# Patient Record
Sex: FEMALE | Race: WHITE | NOT HISPANIC OR LATINO | Employment: PART TIME | URBAN - NONMETROPOLITAN AREA
[De-identification: names, ages, dates, MRNs, and addresses within clinical notes are randomized per-mention and may not be internally consistent; named-entity substitution may affect disease eponyms.]

---

## 2017-02-22 RX ORDER — BETAXOLOL HYDROCHLORIDE 5 MG/ML
2 SOLUTION/ DROPS OPHTHALMIC ONCE
Status: CANCELLED | OUTPATIENT
Start: 2017-02-22 | End: 2017-02-22

## 2017-02-27 RX ORDER — ALBUTEROL SULFATE 2.5 MG/3ML
2.5 SOLUTION RESPIRATORY (INHALATION) EVERY 4 HOURS PRN
COMMUNITY

## 2017-02-27 RX ORDER — NAPROXEN SODIUM 220 MG
220 TABLET ORAL 2 TIMES DAILY PRN
COMMUNITY

## 2017-02-27 RX ORDER — ALBUTEROL SULFATE 90 UG/1
2 AEROSOL, METERED RESPIRATORY (INHALATION) EVERY 4 HOURS PRN
COMMUNITY

## 2017-03-01 ENCOUNTER — HOSPITAL ENCOUNTER (OUTPATIENT)
Facility: HOSPITAL | Age: 72
Setting detail: HOSPITAL OUTPATIENT SURGERY
Discharge: HOME | End: 2017-03-01
Attending: OPHTHALMOLOGY | Admitting: OPHTHALMOLOGY

## 2017-03-01 ENCOUNTER — ANESTHESIA EVENT (OUTPATIENT)
Dept: PERIOP | Facility: HOSPITAL | Age: 72
End: 2017-03-01

## 2017-03-01 ENCOUNTER — ANESTHESIA (OUTPATIENT)
Dept: PERIOP | Facility: HOSPITAL | Age: 72
End: 2017-03-01

## 2017-03-01 VITALS
HEART RATE: 67 BPM | WEIGHT: 165 LBS | SYSTOLIC BLOOD PRESSURE: 166 MMHG | BODY MASS INDEX: 27.49 KG/M2 | DIASTOLIC BLOOD PRESSURE: 88 MMHG | RESPIRATION RATE: 18 BRPM | HEIGHT: 65 IN | OXYGEN SATURATION: 97 % | TEMPERATURE: 97.4 F

## 2017-03-01 PROBLEM — H25.9 SENILE CATARACT OF LEFT EYE: Status: RESOLVED | Noted: 2017-03-01 | Resolved: 2017-03-01

## 2017-03-01 PROBLEM — H25.9 SENILE CATARACT OF LEFT EYE: Status: ACTIVE | Noted: 2017-03-01

## 2017-03-01 LAB — GLUCOSE BLDC GLUCOMTR-MCNC: 114 MG/DL (ref 70–130)

## 2017-03-01 PROCEDURE — V2787 ASTIGMATISM-CORRECT FUNCTION: HCPCS | Performed by: OPHTHALMOLOGY

## 2017-03-01 PROCEDURE — 25010000002 EPINEPHRINE 1 MG/ML SOLUTION: Performed by: OPHTHALMOLOGY

## 2017-03-01 PROCEDURE — 25010000002 MIDAZOLAM PER 1 MG: Performed by: NURSE ANESTHETIST, CERTIFIED REGISTERED

## 2017-03-01 PROCEDURE — V2632 POST CHMBR INTRAOCULAR LENS: HCPCS | Performed by: OPHTHALMOLOGY

## 2017-03-01 PROCEDURE — 82962 GLUCOSE BLOOD TEST: CPT

## 2017-03-01 DEVICE — IMPLANTABLE DEVICE: Type: IMPLANTABLE DEVICE | Site: POSTERIOR CHAMBER | Status: FUNCTIONAL

## 2017-03-01 RX ORDER — KETAMINE HYDROCHLORIDE 50 MG/ML
INJECTION, SOLUTION, CONCENTRATE INTRAMUSCULAR; INTRAVENOUS AS NEEDED
Status: DISCONTINUED | OUTPATIENT
Start: 2017-03-01 | End: 2017-03-01 | Stop reason: SURG

## 2017-03-01 RX ORDER — EPINEPHRINE 1 MG/ML
INJECTION INTRAMUSCULAR; INTRAVENOUS; SUBCUTANEOUS AS NEEDED
Status: DISCONTINUED | OUTPATIENT
Start: 2017-03-01 | End: 2017-03-01 | Stop reason: HOSPADM

## 2017-03-01 RX ORDER — CYCLOPENTOLATE HYDROCHLORIDE 20 MG/ML
1 SOLUTION/ DROPS OPHTHALMIC
Status: COMPLETED | OUTPATIENT
Start: 2017-03-01 | End: 2017-03-01

## 2017-03-01 RX ORDER — PILOCARPINE HYDROCHLORIDE 10 MG/ML
SOLUTION/ DROPS OPHTHALMIC AS NEEDED
Status: DISCONTINUED | OUTPATIENT
Start: 2017-03-01 | End: 2017-03-01 | Stop reason: HOSPADM

## 2017-03-01 RX ORDER — CYCLOPENTOLATE HYDROCHLORIDE 20 MG/ML
SOLUTION/ DROPS OPHTHALMIC
Status: COMPLETED
Start: 2017-03-01 | End: 2017-03-01

## 2017-03-01 RX ORDER — SODIUM CHLORIDE 0.9 % (FLUSH) 0.9 %
3 SYRINGE (ML) INJECTION AS NEEDED
Status: DISCONTINUED | OUTPATIENT
Start: 2017-03-01 | End: 2017-03-01 | Stop reason: HOSPADM

## 2017-03-01 RX ORDER — PHENYLEPHRINE HYDROCHLORIDE 100 MG/ML
1 SOLUTION/ DROPS OPHTHALMIC
Status: COMPLETED | OUTPATIENT
Start: 2017-03-01 | End: 2017-03-01

## 2017-03-01 RX ORDER — TETRACAINE HYDROCHLORIDE 5 MG/ML
SOLUTION OPHTHALMIC
Status: COMPLETED
Start: 2017-03-01 | End: 2017-03-01

## 2017-03-01 RX ORDER — SODIUM CHLORIDE 0.9 % (FLUSH) 0.9 %
1-10 SYRINGE (ML) INJECTION AS NEEDED
Status: DISCONTINUED | OUTPATIENT
Start: 2017-03-01 | End: 2017-03-01 | Stop reason: HOSPADM

## 2017-03-01 RX ORDER — MIDAZOLAM HYDROCHLORIDE 1 MG/ML
INJECTION INTRAMUSCULAR; INTRAVENOUS AS NEEDED
Status: DISCONTINUED | OUTPATIENT
Start: 2017-03-01 | End: 2017-03-01 | Stop reason: SURG

## 2017-03-01 RX ORDER — PHENYLEPHRINE HYDROCHLORIDE 100 MG/ML
SOLUTION/ DROPS OPHTHALMIC
Status: COMPLETED
Start: 2017-03-01 | End: 2017-03-01

## 2017-03-01 RX ORDER — TETRACAINE HYDROCHLORIDE 5 MG/ML
1 SOLUTION OPHTHALMIC SEE ADMIN INSTRUCTIONS
Status: COMPLETED | OUTPATIENT
Start: 2017-03-01 | End: 2017-03-01

## 2017-03-01 RX ORDER — PREDNISOLONE ACETATE 10 MG/ML
SUSPENSION/ DROPS OPHTHALMIC
Status: DISCONTINUED
Start: 2017-03-01 | End: 2017-03-01 | Stop reason: HOSPADM

## 2017-03-01 RX ORDER — LIDOCAINE HYDROCHLORIDE 40 MG/ML
INJECTION, SOLUTION RETROBULBAR; TOPICAL AS NEEDED
Status: DISCONTINUED | OUTPATIENT
Start: 2017-03-01 | End: 2017-03-01 | Stop reason: HOSPADM

## 2017-03-01 RX ORDER — PREDNISOLONE ACETATE 10 MG/ML
SUSPENSION/ DROPS OPHTHALMIC AS NEEDED
Status: DISCONTINUED | OUTPATIENT
Start: 2017-03-01 | End: 2017-03-01 | Stop reason: HOSPADM

## 2017-03-01 RX ORDER — ENALAPRILAT 2.5 MG/2ML
1.25 INJECTION INTRAVENOUS ONCE AS NEEDED
Status: DISCONTINUED | OUTPATIENT
Start: 2017-03-01 | End: 2017-03-01 | Stop reason: HOSPADM

## 2017-03-01 RX ORDER — BALANCED SALT SOLUTION 6.4; .75; .48; .3; 3.9; 1.7 MG/ML; MG/ML; MG/ML; MG/ML; MG/ML; MG/ML
SOLUTION OPHTHALMIC AS NEEDED
Status: DISCONTINUED | OUTPATIENT
Start: 2017-03-01 | End: 2017-03-01 | Stop reason: HOSPADM

## 2017-03-01 RX ORDER — SODIUM CHLORIDE, SODIUM LACTATE, POTASSIUM CHLORIDE, CALCIUM CHLORIDE 600; 310; 30; 20 MG/100ML; MG/100ML; MG/100ML; MG/100ML
1000 INJECTION, SOLUTION INTRAVENOUS CONTINUOUS PRN
Status: DISCONTINUED | OUTPATIENT
Start: 2017-03-01 | End: 2017-03-01 | Stop reason: HOSPADM

## 2017-03-01 RX ADMIN — TETRACAINE HYDROCHLORIDE 1 DROP: 5 SOLUTION OPHTHALMIC at 07:28

## 2017-03-01 RX ADMIN — CYCLOPENTOLATE HYDROCHLORIDE 1 DROP: 20 SOLUTION/ DROPS OPHTHALMIC at 07:30

## 2017-03-01 RX ADMIN — PHENYLEPHRINE HYDROCHLORIDE 1 DROP: 100 SOLUTION/ DROPS OPHTHALMIC at 07:30

## 2017-03-01 RX ADMIN — MIDAZOLAM HYDROCHLORIDE 1 MG: 1 INJECTION, SOLUTION INTRAMUSCULAR; INTRAVENOUS at 08:55

## 2017-03-01 RX ADMIN — KETAMINE HYDROCHLORIDE 10 MG: 50 INJECTION, SOLUTION INTRAMUSCULAR; INTRAVENOUS at 08:55

## 2017-03-01 RX ADMIN — SODIUM CHLORIDE, POTASSIUM CHLORIDE, SODIUM LACTATE AND CALCIUM CHLORIDE 1000 ML: 600; 310; 30; 20 INJECTION, SOLUTION INTRAVENOUS at 07:41

## 2017-03-01 RX ADMIN — PHENYLEPHRINE HYDROCHLORIDE 1 DROP: 100 SOLUTION/ DROPS OPHTHALMIC at 07:35

## 2017-03-01 RX ADMIN — MIDAZOLAM HYDROCHLORIDE 1 MG: 1 INJECTION, SOLUTION INTRAMUSCULAR; INTRAVENOUS at 08:50

## 2017-03-01 RX ADMIN — CYCLOPENTOLATE HYDROCHLORIDE 1 DROP: 20 SOLUTION/ DROPS OPHTHALMIC at 07:40

## 2017-03-01 RX ADMIN — TETRACAINE HYDROCHLORIDE 1 DROP: 5 SOLUTION OPHTHALMIC at 07:29

## 2017-03-01 RX ADMIN — CYCLOPENTOLATE HYDROCHLORIDE 1 DROP: 20 SOLUTION/ DROPS OPHTHALMIC at 07:35

## 2017-03-01 RX ADMIN — PHENYLEPHRINE HYDROCHLORIDE 1 DROP: 100 SOLUTION/ DROPS OPHTHALMIC at 07:40

## 2017-03-01 NOTE — PLAN OF CARE
Problem: Perioperative Period (Adult)  Goal: Signs and Symptoms of Listed Potential Problems Will be Absent or Manageable (Perioperative Period)  Outcome: Ongoing (interventions implemented as appropriate)    03/01/17 0723   Perioperative Period   Problems Assessed (Perioperative Period) all   Problems Present (Perioperative Period) none

## 2017-03-01 NOTE — ANESTHESIA POSTPROCEDURE EVALUATION
Patient: Maia Grimes    Procedure Summary     Date Anesthesia Start Anesthesia Stop Room / Location    03/01/17 0853 0919 [facility] OR 1 / [facility] OR       Procedure Diagnosis Surgeon Provider    LEFT CATARACT PHACO EXTRACTION WITH INTRAOCULAR LENS IMPLANT WITH TORIC LENS (Left Eye) No diagnosis on file. MD Anam Negrete CRNA          Anesthesia Type: MAC  Last vitals  BP      Temp      Pulse     Resp      SpO2        Post Anesthesia Care and Evaluation    Patient location during evaluation: bedside  Patient participation: complete - patient participated  Level of consciousness: awake and alert  Pain management: satisfactory to patient  Airway patency: patent  Anesthetic complications: No anesthetic complications  PONV Status: none  Cardiovascular status: acceptable and stable  Respiratory status: acceptable  Hydration status: acceptable

## 2017-03-01 NOTE — ANESTHESIA PREPROCEDURE EVALUATION
Anesthesia Evaluation     Patient summary reviewed and Nursing notes reviewed   history of anesthetic complications: PONV  NPO Status: > 8 hours   Airway   Mallampati: I  Neck ROM: full  no difficulty expected  Dental      Pulmonary - normal exam   (+) asthma (trigger-upper and lower respiratory infections),   Cardiovascular - normal exam    (-) CHF      Neuro/Psych  GI/Hepatic/Renal/Endo    (+)  diabetes mellitus (diet controlled) type 2 well controlled,     Musculoskeletal     Abdominal    Substance History      OB/GYN          Other                                    Anesthesia Plan    ASA 2     MAC     intravenous induction   Anesthetic plan and risks discussed with patient.

## 2017-03-04 NOTE — OP NOTE
DATE OF PROCEDURE: 03/01/2017    PREOPERATIVE DIAGNOSIS: Left nuclear sclerotic/posterior subcapsular cataract.    POSTOPERATIVE DIAGNOSIS: Left nuclear sclerotic/posterior subcapsular cataract.    PROCEDURE PERFORMED: Phacoemulsification with implantation of a 21.0 diopter, 1.50 CYL foldable posterior chamber intraocular lens, left eye.    SURGEON:  Mi Cárdenas MD     INDICATIONS:  The patient is a 71-year-old female with a left nuclear sclerotic/posterior subcapsular cataract with a preoperative visual acuity of 20/70. The patient desires better vision and is brought to the operating room at this time for elective cataract extraction with planned IOL.      DESCRIPTION OF PROCEDURE:  The patient was brought to the operating room in the fasting state. Once all the vital signs were established to be within normal limits and supplemental oxygen was given, the area of the operative eye was prepped and draped in the usual fashion.  A wire lid speculum was inserted into the cul-de-sac. Additional topical anesthetic drops were instilled. A fornix-based conjunctival flap was performed from the 11-oclock to 1-o'clock position. A stab incision was made in the peripheral cornea with the #75 Micro-Sharp blade. Next 0.3 mL of 1% unpreserved Xylocaine was injected in the anterior chamber. The anterior chamber was then entered with a 2.4 mm keratome blade and then under viscoelastic a capsulotomy was performed using the disposable cystotome in a continuous curvilinear fashion.  The anterior capsule was then removed and the phacoemulsification handpiece was then introduced into the anterior segment and the nucleus was emulsified without difficulty. The CDE was 15.91. Once this was accomplished, the irrigation and aspiration handpiece was then used to aspirate the residual cortex. The posterior capsule was cleaned of any residual material and then polished with the irrigation and aspiration handpiece and the viscoelastic  was used to inflate the capsular bag.  The Ac[employer]of TORIC IOL implant, style [id number] lens was then loaded into the microcartridge. It measured 21.0 diopters and the serial number was [id number]. The injector was then introduced into the capsular bag and the implant was slowly implanted without difficulty. The lens was then rotated to the appropriate axis and the viscoelastic was aspirated. The conjunctiva was then repositioned. Topical Betoptic-S, pilocarpine, and Pred-Forte drops were applied. Polysporin ointment was then instilled. Chilled BSS was used as the irrigating solution. There were no anesthetic or surgical difficulties. The patient tolerated the procedure very well and was returned to same day surgery in satisfactory condition.      Mi Cárdenas M.D.  D: JAROD 03/03/2017 00:00:00  T: breanne 03/03/2017 19:21:15  Job ID: [id number]  Document ID: [id number]

## 2017-05-08 ENCOUNTER — TRANSCRIBE ORDERS (OUTPATIENT)
Dept: ULTRASOUND IMAGING | Facility: HOSPITAL | Age: 72
End: 2017-05-08

## 2017-05-08 DIAGNOSIS — R10.9 ABDOMINAL PAIN, UNSPECIFIED LOCATION: Primary | ICD-10-CM

## 2017-05-08 DIAGNOSIS — Z13.9 SCREENING: ICD-10-CM

## 2017-05-15 ENCOUNTER — APPOINTMENT (OUTPATIENT)
Dept: ULTRASOUND IMAGING | Facility: HOSPITAL | Age: 72
End: 2017-05-15

## 2017-05-19 ENCOUNTER — APPOINTMENT (OUTPATIENT)
Dept: ULTRASOUND IMAGING | Facility: HOSPITAL | Age: 72
End: 2017-05-19

## 2017-05-22 ENCOUNTER — HOSPITAL ENCOUNTER (OUTPATIENT)
Dept: ULTRASOUND IMAGING | Facility: HOSPITAL | Age: 72
Discharge: HOME | End: 2017-05-22
Admitting: FAMILY MEDICINE

## 2017-05-22 DIAGNOSIS — R10.9 ABDOMINAL PAIN, UNSPECIFIED LOCATION: ICD-10-CM

## 2017-05-22 PROCEDURE — 76705 ECHO EXAM OF ABDOMEN: CPT

## 2017-09-20 ENCOUNTER — ANESTHESIA (OUTPATIENT)
Dept: PERIOP | Facility: HOSPITAL | Age: 72
End: 2017-09-20

## 2017-09-20 ENCOUNTER — HOSPITAL ENCOUNTER (OUTPATIENT)
Facility: HOSPITAL | Age: 72
Setting detail: HOSPITAL OUTPATIENT SURGERY
Discharge: HOME | End: 2017-09-20
Attending: OPHTHALMOLOGY | Admitting: OPHTHALMOLOGY

## 2017-09-20 ENCOUNTER — ANESTHESIA EVENT (OUTPATIENT)
Dept: PERIOP | Facility: HOSPITAL | Age: 72
End: 2017-09-20

## 2017-09-20 VITALS
TEMPERATURE: 98.1 F | HEIGHT: 65 IN | DIASTOLIC BLOOD PRESSURE: 70 MMHG | RESPIRATION RATE: 18 BRPM | SYSTOLIC BLOOD PRESSURE: 127 MMHG | WEIGHT: 167 LBS | HEART RATE: 65 BPM | BODY MASS INDEX: 27.82 KG/M2 | OXYGEN SATURATION: 97 %

## 2017-09-20 PROBLEM — H26.9 CATARACT OF RIGHT EYE: Status: ACTIVE | Noted: 2017-03-01

## 2017-09-20 LAB — GLUCOSE BLDC GLUCOMTR-MCNC: 105 MG/DL (ref 70–130)

## 2017-09-20 PROCEDURE — V2632 POST CHMBR INTRAOCULAR LENS: HCPCS | Performed by: OPHTHALMOLOGY

## 2017-09-20 PROCEDURE — 25010000002 EPINEPHRINE 1 MG/ML SOLUTION 1 ML VIAL: Performed by: OPHTHALMOLOGY

## 2017-09-20 PROCEDURE — V2787 ASTIGMATISM-CORRECT FUNCTION: HCPCS | Performed by: OPHTHALMOLOGY

## 2017-09-20 PROCEDURE — 25010000002 MIDAZOLAM PER 1 MG: Performed by: NURSE ANESTHETIST, CERTIFIED REGISTERED

## 2017-09-20 PROCEDURE — 82962 GLUCOSE BLOOD TEST: CPT

## 2017-09-20 DEVICE — IMPLANTABLE DEVICE: Type: IMPLANTABLE DEVICE | Site: POSTERIOR CHAMBER | Status: FUNCTIONAL

## 2017-09-20 RX ORDER — BALANCED SALT SOLUTION 6.4; .75; .48; .3; 3.9; 1.7 MG/ML; MG/ML; MG/ML; MG/ML; MG/ML; MG/ML
SOLUTION OPHTHALMIC
Status: DISCONTINUED
Start: 2017-09-20 | End: 2017-09-20 | Stop reason: HOSPADM

## 2017-09-20 RX ORDER — PHENYLEPHRINE HYDROCHLORIDE 100 MG/ML
1 SOLUTION/ DROPS OPHTHALMIC
Status: COMPLETED | OUTPATIENT
Start: 2017-09-20 | End: 2017-09-20

## 2017-09-20 RX ORDER — LIDOCAINE HYDROCHLORIDE 40 MG/ML
INJECTION, SOLUTION RETROBULBAR; TOPICAL AS NEEDED
Status: DISCONTINUED | OUTPATIENT
Start: 2017-09-20 | End: 2017-09-20 | Stop reason: HOSPADM

## 2017-09-20 RX ORDER — PILOCARPINE HYDROCHLORIDE 10 MG/ML
SOLUTION/ DROPS OPHTHALMIC AS NEEDED
Status: DISCONTINUED | OUTPATIENT
Start: 2017-09-20 | End: 2017-09-20 | Stop reason: HOSPADM

## 2017-09-20 RX ORDER — LIDOCAINE HYDROCHLORIDE 40 MG/ML
INJECTION, SOLUTION RETROBULBAR; TOPICAL
Status: DISCONTINUED
Start: 2017-09-20 | End: 2017-09-20 | Stop reason: HOSPADM

## 2017-09-20 RX ORDER — SODIUM CHLORIDE 0.9 % (FLUSH) 0.9 %
3 SYRINGE (ML) INJECTION AS NEEDED
Status: DISCONTINUED | OUTPATIENT
Start: 2017-09-20 | End: 2017-09-20 | Stop reason: HOSPADM

## 2017-09-20 RX ORDER — PREDNISOLONE ACETATE 10 MG/ML
1 SUSPENSION/ DROPS OPHTHALMIC
Status: DISCONTINUED | OUTPATIENT
Start: 2017-09-20 | End: 2017-09-20 | Stop reason: HOSPADM

## 2017-09-20 RX ORDER — CYCLOPENTOLATE HYDROCHLORIDE 20 MG/ML
1 SOLUTION/ DROPS OPHTHALMIC
Status: COMPLETED | OUTPATIENT
Start: 2017-09-20 | End: 2017-09-20

## 2017-09-20 RX ORDER — EPINEPHRINE 1 MG/ML
INJECTION INTRAMUSCULAR; INTRAVENOUS; SUBCUTANEOUS
Status: DISCONTINUED
Start: 2017-09-20 | End: 2017-09-20 | Stop reason: HOSPADM

## 2017-09-20 RX ORDER — SODIUM CHLORIDE 0.9 % (FLUSH) 0.9 %
1-10 SYRINGE (ML) INJECTION AS NEEDED
Status: DISCONTINUED | OUTPATIENT
Start: 2017-09-20 | End: 2017-09-20 | Stop reason: HOSPADM

## 2017-09-20 RX ORDER — BALANCED SALT SOLUTION 6.4; .75; .48; .3; 3.9; 1.7 MG/ML; MG/ML; MG/ML; MG/ML; MG/ML; MG/ML
SOLUTION OPHTHALMIC AS NEEDED
Status: DISCONTINUED | OUTPATIENT
Start: 2017-09-20 | End: 2017-09-20 | Stop reason: HOSPADM

## 2017-09-20 RX ORDER — SODIUM CHLORIDE, SODIUM LACTATE, POTASSIUM CHLORIDE, CALCIUM CHLORIDE 600; 310; 30; 20 MG/100ML; MG/100ML; MG/100ML; MG/100ML
1000 INJECTION, SOLUTION INTRAVENOUS CONTINUOUS PRN
Status: DISCONTINUED | OUTPATIENT
Start: 2017-09-20 | End: 2017-09-20 | Stop reason: HOSPADM

## 2017-09-20 RX ORDER — PHENYLEPHRINE HYDROCHLORIDE 100 MG/ML
SOLUTION/ DROPS OPHTHALMIC
Status: COMPLETED
Start: 2017-09-20 | End: 2017-09-20

## 2017-09-20 RX ORDER — TETRACAINE HYDROCHLORIDE 5 MG/ML
1 SOLUTION OPHTHALMIC SEE ADMIN INSTRUCTIONS
Status: COMPLETED | OUTPATIENT
Start: 2017-09-20 | End: 2017-09-20

## 2017-09-20 RX ORDER — CYCLOPENTOLATE HYDROCHLORIDE 20 MG/ML
SOLUTION/ DROPS OPHTHALMIC
Status: COMPLETED
Start: 2017-09-20 | End: 2017-09-20

## 2017-09-20 RX ORDER — PILOCARPINE HYDROCHLORIDE 10 MG/ML
SOLUTION/ DROPS OPHTHALMIC
Status: DISCONTINUED
Start: 2017-09-20 | End: 2017-09-20 | Stop reason: HOSPADM

## 2017-09-20 RX ORDER — PREDNISOLONE ACETATE 10 MG/ML
SUSPENSION/ DROPS OPHTHALMIC
Status: DISCONTINUED
Start: 2017-09-20 | End: 2017-09-20 | Stop reason: HOSPADM

## 2017-09-20 RX ORDER — PREDNISOLONE ACETATE 10 MG/ML
SUSPENSION/ DROPS OPHTHALMIC AS NEEDED
Status: DISCONTINUED | OUTPATIENT
Start: 2017-09-20 | End: 2017-09-20 | Stop reason: HOSPADM

## 2017-09-20 RX ORDER — TETRACAINE HYDROCHLORIDE 5 MG/ML
SOLUTION OPHTHALMIC
Status: COMPLETED
Start: 2017-09-20 | End: 2017-09-20

## 2017-09-20 RX ORDER — MIDAZOLAM HYDROCHLORIDE 1 MG/ML
INJECTION INTRAMUSCULAR; INTRAVENOUS AS NEEDED
Status: DISCONTINUED | OUTPATIENT
Start: 2017-09-20 | End: 2017-09-20 | Stop reason: SURG

## 2017-09-20 RX ORDER — ENALAPRILAT 2.5 MG/2ML
1.25 INJECTION INTRAVENOUS ONCE AS NEEDED
Status: DISCONTINUED | OUTPATIENT
Start: 2017-09-20 | End: 2017-09-20 | Stop reason: HOSPADM

## 2017-09-20 RX ADMIN — MIDAZOLAM HYDROCHLORIDE 1 MG: 1 INJECTION, SOLUTION INTRAMUSCULAR; INTRAVENOUS at 07:39

## 2017-09-20 RX ADMIN — CYCLOPENTOLATE HYDROCHLORIDE 1 DROP: 20 SOLUTION/ DROPS OPHTHALMIC at 06:48

## 2017-09-20 RX ADMIN — PHENYLEPHRINE HYDROCHLORIDE 1 DROP: 100 SOLUTION/ DROPS OPHTHALMIC at 06:48

## 2017-09-20 RX ADMIN — CYCLOPENTOLATE HYDROCHLORIDE 1 DROP: 20 SOLUTION/ DROPS OPHTHALMIC at 06:58

## 2017-09-20 RX ADMIN — PHENYLEPHRINE HYDROCHLORIDE 1 DROP: 100 SOLUTION/ DROPS OPHTHALMIC at 06:53

## 2017-09-20 RX ADMIN — CYCLOPENTOLATE HYDROCHLORIDE 1 DROP: 20 SOLUTION/ DROPS OPHTHALMIC at 06:53

## 2017-09-20 RX ADMIN — TETRACAINE HYDROCHLORIDE 1 DROP: 5 SOLUTION OPHTHALMIC at 06:46

## 2017-09-20 RX ADMIN — SODIUM CHLORIDE, POTASSIUM CHLORIDE, SODIUM LACTATE AND CALCIUM CHLORIDE 1000 ML: 600; 310; 30; 20 INJECTION, SOLUTION INTRAVENOUS at 06:54

## 2017-09-20 RX ADMIN — PHENYLEPHRINE HYDROCHLORIDE 1 DROP: 100 SOLUTION/ DROPS OPHTHALMIC at 06:58

## 2017-09-20 RX ADMIN — MIDAZOLAM HYDROCHLORIDE 0.5 MG: 1 INJECTION, SOLUTION INTRAMUSCULAR; INTRAVENOUS at 07:48

## 2017-09-20 RX ADMIN — TETRACAINE HYDROCHLORIDE 1 DROP: 5 SOLUTION OPHTHALMIC at 06:47

## 2017-09-20 RX ADMIN — MIDAZOLAM HYDROCHLORIDE 0.5 MG: 1 INJECTION, SOLUTION INTRAMUSCULAR; INTRAVENOUS at 07:45

## 2017-09-20 NOTE — OP NOTE
OPERATIVE NOTE    DATE OF PROCEDURE: 9/20/2017  PATIENT NAME: Maia Girmes  PATIENT MRN: [id number]  YOB: 1945     PREOPERATIVE DIAGNOSIS: Right nuclear sclerotic cataract.     POSTOPERATIVE DIAGNOSIS: Right nuclear sclerotic cataract.     PROCEDURE PERFORMED: Phacoemulsification with implantation of a 20.5/2.25 diopter, toric  foldable posterior chamber intraocular lens, Right eye.     SURGEON: Mi Cárdenas MD      INDICATIONS: The patient is an 72 y.o. female with a Right nuclear sclerotic cataract with a preoperative visual acuity of 20/60. The patient desires better vision and is brought to the operating room at this time for elective cataract extraction with planned IOL.      DESCRIPTION OF PROCEDURE: The patient was brought to the operating room in the fasting state. Once all the vital signs were established to be within normal limits and supplemental oxygen was given, the area of the operative eye was prepped and draped in the usual fashion. A wire lid speculum was inserted into the cul-de-sac. Additional topical anesthetic drops were instilled. A fornix-based conjunctival flap was performed from the 11-o'clock to 1-o'clock position. A stab incision was made in the peripheral cornea with the 0.8mm stab blade. Next 0.3 mL of 1% unpreserved Xylocaine was injected in the anterior chamber. The anterior chamber was then entered with a 2.4 mm keratome blade and then under viscoelastic a capsulotomy was performed using the disposable cystotome in a continuous curvilinear fashion. The anterior capsule was then removed and the phacoemulsification handpiece was then introduced into the anterior segment and the nucleus was emulsified without difficulty. The CDE was 6.77. Once this was accomplished, the irrigation and aspiration handpiece was then used to aspirate the residual cortex. The posterior capsule was cleaned of any residual material and then polished with the irrigation and  aspiration handpiece and the viscoelastic was used to inflate the capsular bag. The [employer]it AcrySof implant was then loaded into the microcartridge. It measured 20.5/2.25 diopters. The injector was then introduced into the capsular bag and the implant was slowly implanted without difficulty. The lens was then rotated to the appropriate axis and the viscoelastic was aspirated. The conjunctiva was then repositioned. Topical Betoptic-S, pilocarpine, and Pred-Forte drops were applied. Polysporin ointment was then instilled. There were no anesthetic or surgical difficulties. The patient tolerated the procedure very well and was returned to same day surgery in satisfactory condition.       Implant Name Type Inv. Item Serial No.  Lot No. LRB No. Used   LENS ACRYSOF IQ TORIC [id number] 20.5 - [id number] - [id number] Implant LENS ACRYSOF IQ TORIC SN6AT4 20.5 [id number] BALJIT   Right 1           Mi Cárdenas MD  9/20/2017

## 2017-09-20 NOTE — PLAN OF CARE
Problem: Cataract (Adult)  Goal: Signs and Symptoms of Listed Potential Problems Will be Absent or Manageable (Cataract)  Outcome: Ongoing (interventions implemented as appropriate)    09/20/17 0634   Cataract   Problems Assessed (Cataract) all   Problems Present (Cataract) none

## 2017-09-20 NOTE — ANESTHESIA PREPROCEDURE EVALUATION
Anesthesia Evaluation     Patient summary reviewed and Nursing notes reviewed   history of anesthetic complications: PONV  NPO Solid Status: > 8 hours  NPO Liquid Status: > 8 hours     Airway   Mallampati: I  Neck ROM: full  no difficulty expected  Dental    (+) poor dentition    Pulmonary - normal exam   (+) a smoker Current Abstained day of surgery, COPD, asthma (trigger-upper and lower respiratory infections), shortness of breath, decreased breath sounds,   Cardiovascular - normal exam    (+) CHF ( hx of chf), TUCKER,       Neuro/Psych  GI/Hepatic/Renal/Endo    (+)  diabetes mellitus (diet controlled) type 2 well controlled,     Musculoskeletal     (+) arthralgias, back pain, chronic pain,   Abdominal    Substance History      OB/GYN          Other   (+) arthritis                                       Anesthesia Plan    ASA 3     MAC   (Risks and benefits discussed including risk of aspiration, recall and dental damage. All patient questions answered. Will continue with POC.)  intravenous induction   Anesthetic plan and risks discussed with patient.

## 2017-09-20 NOTE — ANESTHESIA POSTPROCEDURE EVALUATION
Patient: Maia Grimes    Procedure Summary     Date Anesthesia Start Anesthesia Stop Room / Location    09/20/17 0738  [facility] OR 1 / [facility] OR       Procedure Diagnosis Surgeon Provider    CATARACT PHACO EXTRACTION WITH INTRAOCULAR LENS IMPLANT RIGHT WITH TORIC LENS (Right Eye) No diagnosis on file. MD Gabino Negrete CRNA          Anesthesia Type: MAC  Last vitals  BP        Temp        Pulse       Resp        SpO2          Post Anesthesia Care and Evaluation    Patient location during evaluation: PHASE II  Patient participation: complete - patient participated  Level of consciousness: awake  Pain score: 0  Pain management: adequate  Airway patency: patent  Anesthetic complications: No anesthetic complications  PONV Status: none  Cardiovascular status: acceptable  Respiratory status: acceptable  Hydration status: acceptable    Comments: vsss resp spont, reflexes intact, responsive, report given to pacu nurse

## 2017-09-20 NOTE — DISCHARGE INSTRUCTIONS
Please follow all post op instructions and follow up appointment time from your physician's office included in your discharge packet.  .   To assist you in voiding:  Drink plenty of fluids  Listen to running water while attempting to void.    If you are unable to urinate and you have an uncomfortable urge to void or it has been   6 hours since you were discharged, return to the Emergency Room

## 2019-01-08 ENCOUNTER — TRANSCRIBE ORDERS (OUTPATIENT)
Dept: ADMINISTRATIVE | Facility: HOSPITAL | Age: 74
End: 2019-01-08

## 2019-01-08 DIAGNOSIS — R91.1 PULMONARY NODULE: Primary | ICD-10-CM

## 2019-01-14 ENCOUNTER — HOSPITAL ENCOUNTER (OUTPATIENT)
Dept: CT IMAGING | Facility: HOSPITAL | Age: 74
Discharge: HOME | End: 2019-01-14
Admitting: FAMILY MEDICINE

## 2019-01-14 DIAGNOSIS — R91.1 PULMONARY NODULE: ICD-10-CM

## 2019-01-14 PROCEDURE — 71250 CT THORAX DX C-: CPT

## 2022-09-08 NOTE — BRIEF OP NOTE
CATARACT PHACO EXTRACTION WITH INTRAOCULAR LENS IMPLANT  Procedure Note    Maia Grimes  3/1/2017    Pre-op Diagnosis:   * No pre-op diagnosis entered *    Post-op Diagnosis:     * No Diagnosis Codes entered *    Procedure/CPT® Codes:      Procedure(s):  LEFT CATARACT PHACO EXTRACTION WITH INTRAOCULAR LENS IMPLANT WITH TORIC LENS    Surgeon(s):  Mi Cárdenas MD    Anesthesia: Monitor Anesthesia Care    Staff:   Circulator: Erika Kelsey RN; Miranda Brown RN  Scrub Person: Vikki Meza; Damaris Mercer    Estimated Blood Loss: * No values recorded between 3/1/2017  8:47 AM and 3/1/2017  9:19 AM *  Urine Voided: * No values recorded between 3/1/2017  8:47 AM and 3/1/2017  9:19 AM *    Specimens:                * No specimens in log *      Drains:           Findings: none    Complications: none      Mi Cárdenas MD     Date: 3/1/2017  Time: 9:26 AM      
no

## 2023-12-30 NOTE — INTERVAL H&P NOTE
H&P reviewed. The patient was examined and there are no changes to the H&P.      
Smoking even a single puff increases the likelihood of a full relapse, withdrawal symptoms peak within 1-2 weeks, but can persist for months

## (undated) DEVICE — 2.0MM SHARPTOME™ CRESCENT KNIFE ANGLED, BEVEL UP: Brand: SHARPOINT

## (undated) DEVICE — HP CONCL INTREPID COAX I/A CRV .3MM

## (undated) DEVICE — GLV SURG SENSICARE W/ALOE PF LF 7.5 STRL

## (undated) DEVICE — GLV SURG SENSICARE W/ALOE PF LF 7 STRL

## (undated) DEVICE — SKIN MARKER,FINE TIP: Brand: DEVON

## (undated) DEVICE — SOL IRRIG NACL 9PCT 1000ML BTL

## (undated) DEVICE — 0.8MM CLEARPORT PARA KNIFE: Brand: SHARPOINT

## (undated) DEVICE — KT POSTOP CATARACT PT CARE

## (undated) DEVICE — PK CATARACT OPTHAMALOGY

## (undated) DEVICE — COVER,MAYO STAND,STERILE: Brand: MEDLINE

## (undated) DEVICE — CLEAR CORNEAL KNIFE 2.4MM ANG: Brand: SHARPOINT

## (undated) DEVICE — SOL IRRIG H2O 1000ML STRL

## (undated) DEVICE — 3M™ STERI-DRAPE™ MEDIUM DRAPE WITH INCISE FILM AND POUCH 1061: Brand: STERI-DRAPE™

## (undated) DEVICE — Device

## (undated) DEVICE — TOWEL,OR,DSP,ST,BLUE,STD,4/PK,20PK/CS: Brand: MEDLINE

## (undated) DEVICE — GLV SURG SENSICARE W/ALOE PF LF 6.5 STRL

## (undated) DEVICE — PREP SOL POVIDONE/IODINE BT 4OZ